# Patient Record
Sex: FEMALE | Race: WHITE | NOT HISPANIC OR LATINO | Employment: UNEMPLOYED | URBAN - METROPOLITAN AREA
[De-identification: names, ages, dates, MRNs, and addresses within clinical notes are randomized per-mention and may not be internally consistent; named-entity substitution may affect disease eponyms.]

---

## 2023-11-01 ENCOUNTER — LAB (OUTPATIENT)
Dept: LAB | Facility: LAB | Age: 53
End: 2023-11-01
Payer: COMMERCIAL

## 2023-11-01 ENCOUNTER — OFFICE VISIT (OUTPATIENT)
Dept: PRIMARY CARE | Facility: CLINIC | Age: 53
End: 2023-11-01
Payer: COMMERCIAL

## 2023-11-01 VITALS
RESPIRATION RATE: 14 BRPM | BODY MASS INDEX: 24.16 KG/M2 | SYSTOLIC BLOOD PRESSURE: 118 MMHG | TEMPERATURE: 98.2 F | HEIGHT: 65 IN | DIASTOLIC BLOOD PRESSURE: 73 MMHG | OXYGEN SATURATION: 98 % | WEIGHT: 145 LBS | HEART RATE: 79 BPM

## 2023-11-01 DIAGNOSIS — R87.611 PAP SMEAR OF CERVIX WITH ASCUS, CANNOT EXCLUDE HGSIL: ICD-10-CM

## 2023-11-01 DIAGNOSIS — Z12.31 ENCOUNTER FOR SCREENING MAMMOGRAM FOR MALIGNANT NEOPLASM OF BREAST: ICD-10-CM

## 2023-11-01 DIAGNOSIS — Z00.00 HEALTHCARE MAINTENANCE: ICD-10-CM

## 2023-11-01 DIAGNOSIS — Z00.00 HEALTHCARE MAINTENANCE: Primary | ICD-10-CM

## 2023-11-01 PROBLEM — N60.91 ATYPICAL DUCTAL HYPERPLASIA OF RIGHT BREAST: Status: ACTIVE | Noted: 2023-11-01

## 2023-11-01 PROBLEM — M25.551 RIGHT HIP PAIN: Status: ACTIVE | Noted: 2023-11-01

## 2023-11-01 LAB
ALBUMIN SERPL BCP-MCNC: 4.4 G/DL (ref 3.4–5)
ALP SERPL-CCNC: 53 U/L (ref 33–110)
ALT SERPL W P-5'-P-CCNC: 19 U/L (ref 7–45)
ANION GAP SERPL CALC-SCNC: 14 MMOL/L (ref 10–20)
AST SERPL W P-5'-P-CCNC: 23 U/L (ref 9–39)
BILIRUB SERPL-MCNC: 0.4 MG/DL (ref 0–1.2)
BUN SERPL-MCNC: 17 MG/DL (ref 6–23)
CALCIUM SERPL-MCNC: 9.5 MG/DL (ref 8.6–10.6)
CHLORIDE SERPL-SCNC: 105 MMOL/L (ref 98–107)
CHOLEST SERPL-MCNC: 242 MG/DL (ref 0–199)
CHOLESTEROL/HDL RATIO: 2.4
CO2 SERPL-SCNC: 28 MMOL/L (ref 21–32)
CREAT SERPL-MCNC: 0.76 MG/DL (ref 0.5–1.05)
ERYTHROCYTE [DISTWIDTH] IN BLOOD BY AUTOMATED COUNT: 12.7 % (ref 11.5–14.5)
GFR SERPL CREATININE-BSD FRML MDRD: >90 ML/MIN/1.73M*2
GLUCOSE SERPL-MCNC: 93 MG/DL (ref 74–99)
HCT VFR BLD AUTO: 43.6 % (ref 36–46)
HDLC SERPL-MCNC: 99.1 MG/DL
HGB BLD-MCNC: 14.2 G/DL (ref 12–16)
LDLC SERPL CALC-MCNC: 132 MG/DL
MCH RBC QN AUTO: 31.6 PG (ref 26–34)
MCHC RBC AUTO-ENTMCNC: 32.6 G/DL (ref 32–36)
MCV RBC AUTO: 97 FL (ref 80–100)
NON HDL CHOLESTEROL: 143 MG/DL (ref 0–149)
NRBC BLD-RTO: 0 /100 WBCS (ref 0–0)
PLATELET # BLD AUTO: 232 X10*3/UL (ref 150–450)
POTASSIUM SERPL-SCNC: 4.2 MMOL/L (ref 3.5–5.3)
PROT SERPL-MCNC: 7.2 G/DL (ref 6.4–8.2)
RBC # BLD AUTO: 4.49 X10*6/UL (ref 4–5.2)
SODIUM SERPL-SCNC: 143 MMOL/L (ref 136–145)
TRIGL SERPL-MCNC: 56 MG/DL (ref 0–149)
VLDL: 11 MG/DL (ref 0–40)
WBC # BLD AUTO: 6.7 X10*3/UL (ref 4.4–11.3)

## 2023-11-01 PROCEDURE — 36415 COLL VENOUS BLD VENIPUNCTURE: CPT

## 2023-11-01 PROCEDURE — 99396 PREV VISIT EST AGE 40-64: CPT | Performed by: FAMILY MEDICINE

## 2023-11-01 PROCEDURE — 3008F BODY MASS INDEX DOCD: CPT | Performed by: FAMILY MEDICINE

## 2023-11-01 PROCEDURE — 80053 COMPREHEN METABOLIC PANEL: CPT

## 2023-11-01 PROCEDURE — 1036F TOBACCO NON-USER: CPT | Performed by: FAMILY MEDICINE

## 2023-11-01 PROCEDURE — 80061 LIPID PANEL: CPT

## 2023-11-01 PROCEDURE — 85027 COMPLETE CBC AUTOMATED: CPT

## 2023-11-01 RX ORDER — CYCLOBENZAPRINE HCL 10 MG
10 TABLET ORAL EVERY 8 HOURS PRN
COMMUNITY
Start: 2022-12-09 | End: 2023-11-01 | Stop reason: WASHOUT

## 2023-11-01 RX ORDER — MUPIROCIN 20 MG/G
OINTMENT TOPICAL
COMMUNITY
Start: 2022-11-21 | End: 2023-11-01 | Stop reason: WASHOUT

## 2023-11-01 RX ORDER — ASPIRIN 81 MG/1
81 TABLET ORAL 2 TIMES DAILY
COMMUNITY
Start: 2022-11-29 | End: 2023-11-01 | Stop reason: WASHOUT

## 2023-11-01 RX ORDER — NAPROXEN SODIUM 220 MG
220 TABLET ORAL EVERY 6 HOURS PRN
COMMUNITY
Start: 2022-12-01 | End: 2023-11-01 | Stop reason: WASHOUT

## 2023-11-01 RX ORDER — ONDANSETRON 4 MG/1
TABLET, FILM COATED ORAL
COMMUNITY
Start: 2022-11-29 | End: 2023-11-01 | Stop reason: WASHOUT

## 2023-11-01 NOTE — ASSESSMENT & PLAN NOTE
4/2018 (ASCUS positive, HPV negative)  10/2022 (ASCUS, cannot exclude HGSIL)  11/2022 GYN eval (Dr. Banda) w/ neg COLP/bx.  Repeat pap smear due 2025

## 2023-11-01 NOTE — ASSESSMENT & PLAN NOTE
Vaccines and screenings reviewed.  Questionnaires completed.  Health and wellness topics reviewed.  Diet and exercise recommendations revisited.  Routine blood work ordered today.     VACCINES:  -Flu vaccine deferred today  -TDAP is up to date until 2030  -Shingrix previously completed, good for lifetime.  -Prevnar-20 recommended at age 65.    SCREENINGS:  -Screening mammogram is due, ordered today.  -Screening pap smear due for repeat in 2025  -Screening cologuard negative 2021, due for repeat 2024.  -Screening DEXA is recommended starting at age 65    LIFESTYLE MEASURES  -make sure you are avoiding refined carbs such as breads, pasta, cereal, candy, soda,  nutrition bars, granola, chips, and sugar sweetened beverages.      -eat 5- 7 servings daily of veggies,  healthy protein such as chicken, fish,  beans, and eggs, and include healthy fats in your diet such as seeds, nuts, olive oil, avocados, and salmon.   -exercise 4 - 6 days per week as you are able, 150 minutes total weekly divided up is recommended.  -Vitamin D is recommended at 1000 - 5000 IU international units daily.   -Always wear sunscreen when you have sun exposure.  -64 oz of water is recommended daily.  -Dental visits recommended every 6 months.  -Eye exam recommended every 2 years, for those with vision problems every year.

## 2023-11-01 NOTE — PATIENT INSTRUCTIONS
Healthcare maintenance  Vaccines and screenings reviewed.  Questionnaires completed.  Health and wellness topics reviewed.  Diet and exercise recommendations revisited.  Routine blood work ordered today.     VACCINES:  -Flu vaccine deferred today  -TDAP is up to date until 2030  -Shingrix previously completed, good for lifetime.  -Prevnar-20 recommended at age 65.    SCREENINGS:  -Screening mammogram is due, ordered today.  -Screening pap smear due for repeat in 2025  -Screening cologuard negative 2021, due for repeat 2024.  -Screening DEXA is recommended starting at age 65    LIFESTYLE MEASURES  -make sure you are avoiding refined carbs such as breads, pasta, cereal, candy, soda,  nutrition bars, granola, chips, and sugar sweetened beverages.      -eat 5- 7 servings daily of veggies,  healthy protein such as chicken, fish,  beans, and eggs, and include healthy fats in your diet such as seeds, nuts, olive oil, avocados, and salmon.   -exercise 4 - 6 days per week as you are able, 150 minutes total weekly divided up is recommended.  -Vitamin D is recommended at 1000 - 5000 IU international units daily.   -Always wear sunscreen when you have sun exposure.  -64 oz of water is recommended daily.  -Dental visits recommended every 6 months.  -Eye exam recommended every 2 years, for those with vision problems every year.      Pap smear of cervix with ASCUS, cannot exclude HGSIL  4/2018 (ASCUS positive, HPV negative)  10/2022 (ASCUS, cannot exclude HGSIL)  11/2022 GYN eval (Dr. Banda) w/ neg COLP/bx.  Repeat pap smear due 2025

## 2023-11-01 NOTE — PROGRESS NOTES
Subjective   Patient ID: America Hoff is a 52 y.o. female who presents for Annual Exam (Pt presents for annual physical exam- ABN given to pt and signed, pt verbalized understanding. Pt states no concerns at this time. ).    HPI     Patient presents today for annual physical.  Patient is doing well overall, they have no new concerns or issues.     WELLNESS VISIT   TDAP: 6/2020  SHINGRIX: completed  PNEUMOVAX: N/A  PAP: 4/2018 (ASCUS positive, HPV negative); 10/2022 (ASCUS, cannot exclude HGSIL); 11/2022 GYN eval (Dr. Banda) w/ neg COLP/bx; repeat in 2025  MAMMO: 11/2022 (hx of R breast benign biopsy)  CSCOPE: 7/2021 Cologuard negative (due 2024)  DEXA: N/A  HEP C SCREEN: none found  CACS: none found  LIPID: 11/2022 TC/HDL ratio 2.6, HDL 90, , TRIG 63    Diet: overall balanced, eats seasonally  Exercise: walks daily, about 45 min per day  Alcohol use: none  Smoking: never smoker    Dental visits: UTD  Vision screening: goes routinely, wears reading glasses, states she should schedule this soon.    Patient declines influenza vaccine.    Cervical cancer screening: UTD, due 2025  Hx of abnormal paps:   4/2018 (ASCUS positive, HPV negative)  10/2022 (ASCUS, cannot exclude HGSIL)  11/2022 GYN eval (Dr. Banda) w/ neg COLP/bx.  Denies family history in first degree relative.  Denies pelvic pain, vaginal discharge, or vaginal bleeding.    Breast cancer screening: DUE  Denies family history in first degree relative.  Denies lumps/bumps, skin changes, nipple retraction, or nipple drainage.    Colon cancer screening: UTD, due in 2024  Denies family history in first degree relative.  Denies melena, hematochezia, constipation, diarrhea, bloating, change in bowel habits.    Review of Systems   All other systems reviewed and are negative.    Objective   /73 (BP Location: Right arm, Patient Position: Sitting, BP Cuff Size: Small adult)   Pulse 79   Temp 36.8 °C (98.2 °F) (Temporal)   Resp 14   Ht 1.638 m (5'  "4.5\")   Wt 65.8 kg (145 lb)   LMP 02/01/2021   SpO2 98%   BMI 24.50 kg/m²     Physical Exam  Vitals and nursing note reviewed.   Constitutional:       General: She is not in acute distress.     Appearance: Normal appearance. She is not toxic-appearing.   HENT:      Head: Normocephalic and atraumatic.   Eyes:      Extraocular Movements: Extraocular movements intact.      Pupils: Pupils are equal, round, and reactive to light.   Neck:      Thyroid: No thyromegaly.   Cardiovascular:      Rate and Rhythm: Normal rate and regular rhythm.      Heart sounds: No murmur heard.     No friction rub. No gallop.   Pulmonary:      Effort: Pulmonary effort is normal.      Breath sounds: Normal breath sounds. No wheezing, rhonchi or rales.   Abdominal:      General: Bowel sounds are normal. There is no distension.      Palpations: Abdomen is soft. There is no mass.      Tenderness: There is no abdominal tenderness. There is no guarding.   Musculoskeletal:      Right lower leg: No edema.      Left lower leg: No edema.   Lymphadenopathy:      Cervical: No cervical adenopathy.   Neurological:      General: No focal deficit present.      Mental Status: She is alert and oriented to person, place, and time.   Psychiatric:         Mood and Affect: Mood normal.         Behavior: Behavior normal.         Assessment/Plan   Problem List Items Addressed This Visit             ICD-10-CM    Pap smear of cervix with ASCUS, cannot exclude HGSIL R87.611     4/2018 (ASCUS positive, HPV negative)  10/2022 (ASCUS, cannot exclude HGSIL)  11/2022 GYN eval (Dr. Banda) w/ neg COLP/bx.  Repeat pap smear due 2025         Healthcare maintenance - Primary Z00.00     Vaccines and screenings reviewed.  Questionnaires completed.  Health and wellness topics reviewed.  Diet and exercise recommendations revisited.  Routine blood work ordered today.     VACCINES:  -Flu vaccine deferred today  -TDAP is up to date until 2030  -Shingrix previously completed, good " for lifetime.  -Prevnar-20 recommended at age 65.    SCREENINGS:  -Screening mammogram is due, ordered today.  -Screening pap smear due for repeat in 2025  -Screening cologuard negative 2021, due for repeat 2024.  -Screening DEXA is recommended starting at age 65    LIFESTYLE MEASURES  -make sure you are avoiding refined carbs such as breads, pasta, cereal, candy, soda,  nutrition bars, granola, chips, and sugar sweetened beverages.      -eat 5- 7 servings daily of veggies,  healthy protein such as chicken, fish,  beans, and eggs, and include healthy fats in your diet such as seeds, nuts, olive oil, avocados, and salmon.   -exercise 4 - 6 days per week as you are able, 150 minutes total weekly divided up is recommended.  -Vitamin D is recommended at 1000 - 5000 IU international units daily.   -Always wear sunscreen when you have sun exposure.  -64 oz of water is recommended daily.  -Dental visits recommended every 6 months.  -Eye exam recommended every 2 years, for those with vision problems every year.            Other Visit Diagnoses         Codes    Body mass index (BMI) of 24.0 to 24.9 in adult     Z68.24    Encounter for screening mammogram for malignant neoplasm of breast     Z12.31          Follow-up in 1 year for annual physical.   Call for sooner follow-up if needed.     Scribe Attestation  By signing my name below, Flor OLIVA Scribe   attest that this documentation has been prepared under the direction and in the presence of Gabby Altamirano DO.

## 2023-11-13 ENCOUNTER — ANCILLARY PROCEDURE (OUTPATIENT)
Dept: RADIOLOGY | Facility: CLINIC | Age: 53
End: 2023-11-13
Payer: COMMERCIAL

## 2023-11-13 DIAGNOSIS — Z12.31 ENCOUNTER FOR SCREENING MAMMOGRAM FOR MALIGNANT NEOPLASM OF BREAST: ICD-10-CM

## 2023-11-13 PROCEDURE — 77067 SCR MAMMO BI INCL CAD: CPT

## 2023-11-13 PROCEDURE — 77063 BREAST TOMOSYNTHESIS BI: CPT | Performed by: STUDENT IN AN ORGANIZED HEALTH CARE EDUCATION/TRAINING PROGRAM

## 2023-11-13 PROCEDURE — 77067 SCR MAMMO BI INCL CAD: CPT | Performed by: STUDENT IN AN ORGANIZED HEALTH CARE EDUCATION/TRAINING PROGRAM

## 2024-11-04 ENCOUNTER — APPOINTMENT (OUTPATIENT)
Dept: PRIMARY CARE | Facility: CLINIC | Age: 54
End: 2024-11-04
Payer: COMMERCIAL

## 2024-11-04 VITALS
HEART RATE: 71 BPM | HEIGHT: 65 IN | SYSTOLIC BLOOD PRESSURE: 126 MMHG | OXYGEN SATURATION: 98 % | TEMPERATURE: 97.3 F | DIASTOLIC BLOOD PRESSURE: 74 MMHG | BODY MASS INDEX: 24.21 KG/M2 | WEIGHT: 145.3 LBS

## 2024-11-04 DIAGNOSIS — Z00.00 HEALTHCARE MAINTENANCE: Primary | ICD-10-CM

## 2024-11-04 DIAGNOSIS — R03.0 ELEVATED BP WITHOUT DIAGNOSIS OF HYPERTENSION: ICD-10-CM

## 2024-11-04 DIAGNOSIS — E78.00 ELEVATED LDL CHOLESTEROL LEVEL: ICD-10-CM

## 2024-11-04 DIAGNOSIS — R87.611 PAP SMEAR OF CERVIX WITH ASCUS, CANNOT EXCLUDE HGSIL: ICD-10-CM

## 2024-11-04 DIAGNOSIS — Z12.11 COLON CANCER SCREENING: ICD-10-CM

## 2024-11-04 DIAGNOSIS — M25.569 KNEE PAIN, UNSPECIFIED CHRONICITY, UNSPECIFIED LATERALITY: ICD-10-CM

## 2024-11-04 DIAGNOSIS — Z12.31 SCREENING MAMMOGRAM FOR BREAST CANCER: ICD-10-CM

## 2024-11-04 PROBLEM — M65.311 TRIGGER THUMB OF RIGHT HAND: Status: ACTIVE | Noted: 2023-11-16

## 2024-11-04 PROBLEM — M25.551 RIGHT HIP PAIN: Status: RESOLVED | Noted: 2023-11-01 | Resolved: 2024-11-04

## 2024-11-04 PROCEDURE — 99396 PREV VISIT EST AGE 40-64: CPT | Performed by: FAMILY MEDICINE

## 2024-11-04 PROCEDURE — 1036F TOBACCO NON-USER: CPT | Performed by: FAMILY MEDICINE

## 2024-11-04 PROCEDURE — 3008F BODY MASS INDEX DOCD: CPT | Performed by: FAMILY MEDICINE

## 2024-11-04 NOTE — PROGRESS NOTES
Subjective   Patient ID: America Hoff is a 53 y.o. female who presents for Annual Exam (Pt presents for annual physical exam- ABN was given to pt and signed, pt verbalized understanding. Pt states no concerns/questions at this time. ) and Flu Vaccine (Pt declines flu vaccine at this time. /).    HPI     Patient presents today for annual physical.  Patient is doing well overall, they have no new concerns or issues.     WELLNESS VISIT   TDAP: 6/2020  SHINGRIX: completed  PNEUMOVAX: N/A  PAP: 4/2018 (ASCUS positive, HPV negative); 10/2022 (ASCUS, cannot exclude HGSIL); 11/2022 GYN eval (Dr. Banda) w/ neg COLP/bx; repeat in 2025  MAMMO: 11/2023 (hx of R breast benign biopsy)  CSCOPE: 7/2021 Cologuard negative (due 2024)  DEXA: N/A  HEP C SCREEN: none found  CACS: none found  LIPID: 11/2023 TC/HDL ratio 2.4, HDL 99, , TRIG 56    Patient declines influenza vaccine.    Diet: overall balanced  Exercise: admits room for improvement, knee issue in past  Alcohol use: occasional, maybe once per year  Smoking: never smoker    Dental: utd  Vision: reports plans to schedule this soon    Cervical cancer screening: UTD, due 2025  Hx of abnormal paps:   4/2018 (ASCUS positive, HPV negative)  10/2022 (ASCUS, cannot exclude HGSIL)  11/2022 GYN eval (Dr. Banda) w/ neg COLP/bx.  Denies family history in first degree relative.  Denies pelvic pain, vaginal discharge, or vaginal bleeding.    Breast cancer screening: DUE  Denies family history in first degree relative.  Denies lumps/bumps, skin changes, nipple retraction, or nipple drainage.    Colon cancer screening: DUE  Denies family history in first degree relative.  Denies melena, hematochezia, constipation, diarrhea, bloating, change in bowel habits.    Thyroid disorder screening:   Denies family history in first degree relative.  Denies heat or cold intolerance, diarrhea or constipation, coarsening of hair, and palpitations.     Cardiac disorder screening:   Denies family  "history in first degree relative.  Denies chest pain, SOB, palpitations, edema, dizziness.     BP mildly elevated today - 133/72 - outlier for patient  Denies family history  States not normally elevated today    Review of Systems   All other systems reviewed and are negative.      Objective   /74 (BP Location: Right arm, Patient Position: Sitting, BP Cuff Size: Adult)   Pulse 71   Temp 36.3 °C (97.3 °F) (Temporal)   Ht 1.638 m (5' 4.5\")   Wt 65.9 kg (145 lb 4.8 oz)   LMP 02/01/2021   SpO2 98%   BMI 24.56 kg/m²     Physical Exam  Vitals and nursing note reviewed.   Constitutional:       General: She is not in acute distress.     Appearance: Normal appearance. She is not toxic-appearing.   HENT:      Head: Normocephalic and atraumatic.   Eyes:      Extraocular Movements: Extraocular movements intact.      Pupils: Pupils are equal, round, and reactive to light.   Neck:      Thyroid: No thyromegaly.   Cardiovascular:      Rate and Rhythm: Normal rate and regular rhythm.      Heart sounds: No murmur heard.     No friction rub. No gallop.   Pulmonary:      Effort: Pulmonary effort is normal.      Breath sounds: Normal breath sounds. No wheezing, rhonchi or rales.   Abdominal:      General: Bowel sounds are normal. There is no distension.      Palpations: Abdomen is soft. There is no mass.      Tenderness: There is no abdominal tenderness. There is no guarding.   Musculoskeletal:      Right lower leg: No edema.      Left lower leg: No edema.      Comments:   R knee:  Knee was examined in the flexed and straight position.    No tenderness along the medial or lateral joint line.   Pes anserine bursa was nontender.   No redness, warmth, or effusion noted.   No laxity noted in the lateral collateral or medial collateral ligaments with varus or valgus stress.    Compression tests evaluating menisci were negative.     No laxity noted with Lachman's or anterior drawer.   Full range of motion was noted in the knee.  "   No fullness was noted in the popliteal fossa.    Lymphadenopathy:      Cervical: No cervical adenopathy.   Neurological:      General: No focal deficit present.      Mental Status: She is alert and oriented to person, place, and time.   Psychiatric:         Mood and Affect: Mood normal.         Behavior: Behavior normal.         Assessment/Plan   Problem List Items Addressed This Visit             ICD-10-CM    Pap smear of cervix with ASCUS, cannot exclude HGSIL R87.611     04/2018 (ASCUS positive, HPV negative)  10/2022 (ASCUS, cannot exclude HGSIL)  11/2022 GYN eval (Dr. Banda) w/ neg COLP/bx.  Repeat pap smear due 2025         Healthcare maintenance - Primary Z00.00     Vaccines and screenings reviewed.  Questionnaires completed.  Health and wellness topics reviewed.  Diet and exercise recommendations revisited.  Routine blood work reviewed today, done in 11/2023 and unremarkable at that time, defers repeat screening labs at this time.    VACCINES:  -Flu vaccine deferred today  -TDAP is up to date until 2030  -Shingrix previously completed, good for lifetime.  -Prevnar-20 recommended at age 65.    SCREENINGS:  -Screening mammogram is due, ordered today.  -Screening pap smear due for repeat in 2025  -Screening cologuard negative 2021, due for repeat 2024, ordered today.  -Screening DEXA is recommended starting at age 65    LIFESTYLE MEASURES  -make sure you are avoiding refined carbs such as breads, pasta, cereal, candy, soda,  nutrition bars, granola, chips, and sugar sweetened beverages.      -eat 5- 7 servings daily of veggies,  healthy protein such as chicken, fish,  beans, and eggs, and include healthy fats in your diet such as seeds, nuts, olive oil, avocados, and salmon.   -exercise 4 - 6 days per week as you are able, 150 minutes total weekly divided up is recommended.  -Vitamin D is recommended at 1000 - 5000 IU international units daily.   -Always wear sunscreen when you have sun exposure.  -64 oz of  water is recommended daily.  -Dental visits recommended every 6 months.  -Eye exam recommended every 2 years, for those with vision problems every year.           Elevated BP without diagnosis of hypertension R03.0     BP is 133/72 in office today - this is mildly elevated but outlier for patient.  Goal BP is 130/80 or less, ideally 120/80 or less.     Will have MA recheck BP today - if remains elevated will have her back to recheck in office in 6 months.  Recheck /74 - will have her back in 1 year  Not elevated enough to necessitate medication therapy at this time.  I have asked that she monitor periodically outside the office (can do at [facility], etc if does not have a cuff)  Diet and exercise recommendations revisited.     Patient is encouraged to continue low sodium diet (Dietary Approach to Stop Hypertension, or DASH diet) .   Exercise most days of the week.   Limit refined carbohydrates such as pretzels, cereals, breads, pastries, alcohol.    If patient wishes to try a natural approach to lowering blood pressure, can consider:  -whey protein 20 -30 g daily (hydrolyzed is best, but any is ok)  -aged garlic 600 mg twice daily (Kyolic brand aged garlic on line is one example)   -CoQ10 supplement at 120 mg - 360 mg daily (average dose studied 225mg daily).     Patient to check BP regularly at home and keep a diary.  This should be taken after sitting with feet flat on floor and resting for 5 minutes.   Arm should be level with your heart.   New guidelines recommend goal for blood pressure less than 130/80.   Ideally for stroke and heart attack risk reduction the systolic, or top, blood pressure number should be in the 110's or 120's.         Elevated LDL cholesterol level E78.00     11/2023 TC/HDL ratio 2.4, HDL 99, , TRIG 56  Goal TC/HDL ratio 3.4 or less, LDL 99 or less,  or less, and TRIG 150 or less.     HDL, the protective cholesterol is nice and robust, limited to no EtOH intake  LDL, the  bad cholesterol, is mildly elevated but given the TC/HDL ratio is below goal this is less concerning    Lifestyle measures for good cholesterol measures:  -make sure you are avoiding refined carbs such as breads, pasta, cereal, candy, soda,  nutrition bars, granola, chips, and sugar sweetened beverages.      -eat 5- 7 servings daily of veggies,  healthy protein such as chicken, fish,  beans, and eggs, and include healthy fats in your diet such as seeds, nuts, olive oil, avocados, and salmon.   -exercise 4 - 6 days per week as you are able, 150 minutes total weekly divided up is recommended with 3-4 of those days including resistance/strength training.  -consider taking the fiber product psyllium capsules or powder 2 times daily (generic brand is fine) , or a brand name psyllium such as [place] [facility] or Metamucil.  -consider also adding plant stanols and sterols such as Nature Made CholestOff, available over the counter.          Knee pain M25.569     Mild, exam reassuring in the office today.  X-ray not warranted at this time.  Can call for order for physical therapy and/or x-ray if wishes to pursue.          Other Visit Diagnoses         Codes    Body mass index (BMI) of 24.0 to 24.9 in adult     Z68.24    Screening mammogram for breast cancer     Z12.31    Relevant Orders    BI mammo bilateral screening tomosynthesis    Colon cancer screening     Z12.11    Relevant Orders    Cologuard® colon cancer screening            Recheck BP in 120s/70s, return to PCP in 1 year, call for sooner visit if finds BP elevated outside the office with any consistency.    Follow-up in 1 year for annual physical.   Call for sooner follow-up if needed.       Scribe Attestation  By signing my name below, IFlor Scribe   attest that this documentation has been prepared under the direction and in the presence of Gabby Altamirano DO.

## 2024-11-04 NOTE — ASSESSMENT & PLAN NOTE
Vaccines and screenings reviewed.  Questionnaires completed.  Health and wellness topics reviewed.  Diet and exercise recommendations revisited.  Routine blood work reviewed today, done in 11/2023 and unremarkable at that time, defers repeat screening labs at this time.    VACCINES:  -Flu vaccine deferred today  -TDAP is up to date until 2030  -Shingrix previously completed, good for lifetime.  -Prevnar-20 recommended at age 65.    SCREENINGS:  -Screening mammogram is due, ordered today.  -Screening pap smear due for repeat in 2025  -Screening cologuard negative 2021, due for repeat 2024, ordered today.  -Screening DEXA is recommended starting at age 65    LIFESTYLE MEASURES  -make sure you are avoiding refined carbs such as breads, pasta, cereal, candy, soda,  nutrition bars, granola, chips, and sugar sweetened beverages.      -eat 5- 7 servings daily of veggies,  healthy protein such as chicken, fish,  beans, and eggs, and include healthy fats in your diet such as seeds, nuts, olive oil, avocados, and salmon.   -exercise 4 - 6 days per week as you are able, 150 minutes total weekly divided up is recommended.  -Vitamin D is recommended at 1000 - 5000 IU international units daily.   -Always wear sunscreen when you have sun exposure.  -64 oz of water is recommended daily.  -Dental visits recommended every 6 months.  -Eye exam recommended every 2 years, for those with vision problems every year.

## 2024-11-04 NOTE — ASSESSMENT & PLAN NOTE
04/2018 (ASCUS positive, HPV negative)  10/2022 (ASCUS, cannot exclude HGSIL)  11/2022 GYN eval (Dr. Banda) w/ neg COLP/bx.  Repeat pap smear due 2025

## 2024-11-04 NOTE — ASSESSMENT & PLAN NOTE
Mild, exam reassuring in the office today.  X-ray not warranted at this time.  Physical therapy referral offered, patient declines at this time.  Can call for order for physical therapy and/or x-ray if wishes to pursue.

## 2024-11-04 NOTE — ASSESSMENT & PLAN NOTE
BP is 133/72 in office today - this is mildly elevated but outlier for patient.  Goal BP is 130/80 or less, ideally 120/80 or less.     Will have MA recheck BP today - if remains elevated will have her back to recheck in office in 6 months.  Recheck /74 - will have her back in 1 year  Not elevated enough to necessitate medication therapy at this time.  I have asked that she monitor periodically outside the office (can do at [facility], etc if does not have a cuff)  Diet and exercise recommendations revisited.     Patient is encouraged to continue low sodium diet (Dietary Approach to Stop Hypertension, or DASH diet) .   Exercise most days of the week.   Limit refined carbohydrates such as pretzels, cereals, breads, pastries, alcohol.    If patient wishes to try a natural approach to lowering blood pressure, can consider:  -whey protein 20 -30 g daily (hydrolyzed is best, but any is ok)  -aged garlic 600 mg twice daily (Kyolic brand aged garlic on line is one example)   -CoQ10 supplement at 120 mg - 360 mg daily (average dose studied 225mg daily).     Patient to check BP regularly at home and keep a diary.  This should be taken after sitting with feet flat on floor and resting for 5 minutes.   Arm should be level with your heart.   New guidelines recommend goal for blood pressure less than 130/80.   Ideally for stroke and heart attack risk reduction the systolic, or top, blood pressure number should be in the 110's or 120's.

## 2024-11-04 NOTE — ASSESSMENT & PLAN NOTE
11/2023 TC/HDL ratio 2.4, HDL 99, , TRIG 56  Goal TC/HDL ratio 3.4 or less, LDL 99 or less,  or less, and TRIG 150 or less.     HDL, the protective cholesterol is nice and robust, limited to no EtOH intake  LDL, the bad cholesterol, is mildly elevated but given the TC/HDL ratio is below goal this is less concerning    Lifestyle measures for good cholesterol measures:  -make sure you are avoiding refined carbs such as breads, pasta, cereal, candy, soda,  nutrition bars, granola, chips, and sugar sweetened beverages.      -eat 5- 7 servings daily of veggies,  healthy protein such as chicken, fish,  beans, and eggs, and include healthy fats in your diet such as seeds, nuts, olive oil, avocados, and salmon.   -exercise 4 - 6 days per week as you are able, 150 minutes total weekly divided up is recommended with 3-4 of those days including resistance/strength training.  -consider taking the fiber product psyllium capsules or powder 2 times daily (generic brand is fine) , or a brand name psyllium such as [place] [facility] or Metamucil.  -consider also adding plant stanols and sterols such as Nature Made CholestOff, available over the counter.

## 2024-11-15 ENCOUNTER — HOSPITAL ENCOUNTER (OUTPATIENT)
Dept: RADIOLOGY | Facility: CLINIC | Age: 54
Discharge: HOME | End: 2024-11-15
Payer: COMMERCIAL

## 2024-11-15 VITALS — WEIGHT: 145.28 LBS | BODY MASS INDEX: 24.8 KG/M2 | HEIGHT: 64 IN

## 2024-11-15 DIAGNOSIS — Z12.31 SCREENING MAMMOGRAM FOR BREAST CANCER: ICD-10-CM

## 2024-11-15 PROCEDURE — 77067 SCR MAMMO BI INCL CAD: CPT

## 2024-11-26 LAB — NONINV COLON CA DNA+OCC BLD SCRN STL QL: NEGATIVE

## 2025-11-04 ENCOUNTER — APPOINTMENT (OUTPATIENT)
Dept: PRIMARY CARE | Facility: CLINIC | Age: 55
End: 2025-11-04
Payer: COMMERCIAL

## 2025-11-05 ENCOUNTER — APPOINTMENT (OUTPATIENT)
Dept: PRIMARY CARE | Facility: CLINIC | Age: 55
End: 2025-11-05
Payer: COMMERCIAL